# Patient Record
Sex: FEMALE | Race: WHITE | NOT HISPANIC OR LATINO | Employment: FULL TIME | URBAN - METROPOLITAN AREA
[De-identification: names, ages, dates, MRNs, and addresses within clinical notes are randomized per-mention and may not be internally consistent; named-entity substitution may affect disease eponyms.]

---

## 2017-05-16 ENCOUNTER — HOSPITAL ENCOUNTER (OUTPATIENT)
Dept: MAMMOGRAPHY | Facility: CLINIC | Age: 62
Discharge: HOME | End: 2017-05-16
Attending: FAMILY MEDICINE | Admitting: FAMILY MEDICINE
Payer: COMMERCIAL

## 2017-05-16 DIAGNOSIS — Z12.31 VISIT FOR SCREENING MAMMOGRAM: ICD-10-CM

## 2017-05-16 PROCEDURE — G0202 SCR MAMMO BI INCL CAD: HCPCS

## 2017-06-17 ENCOUNTER — HEALTH MAINTENANCE LETTER (OUTPATIENT)
Age: 62
End: 2017-06-17

## 2017-12-19 ENCOUNTER — HOSPITAL ENCOUNTER (OUTPATIENT)
Facility: CLINIC | Age: 62
Discharge: HOME | End: 2017-12-19
Attending: COLON & RECTAL SURGERY | Admitting: COLON & RECTAL SURGERY
Payer: COMMERCIAL

## 2017-12-19 ENCOUNTER — SURGERY (OUTPATIENT)
Age: 62
End: 2017-12-19

## 2017-12-19 VITALS
HEIGHT: 66 IN | RESPIRATION RATE: 17 BRPM | DIASTOLIC BLOOD PRESSURE: 78 MMHG | BODY MASS INDEX: 23.63 KG/M2 | SYSTOLIC BLOOD PRESSURE: 122 MMHG | WEIGHT: 147 LBS | OXYGEN SATURATION: 99 %

## 2017-12-19 LAB — COLONOSCOPY: NORMAL

## 2017-12-19 PROCEDURE — 25000128 H RX IP 250 OP 636: Performed by: COLON & RECTAL SURGERY

## 2017-12-19 PROCEDURE — 45378 DIAGNOSTIC COLONOSCOPY: CPT | Performed by: COLON & RECTAL SURGERY

## 2017-12-19 PROCEDURE — G0500 MOD SEDAT ENDO SERVICE >5YRS: HCPCS | Performed by: COLON & RECTAL SURGERY

## 2017-12-19 PROCEDURE — G0121 COLON CA SCRN NOT HI RSK IND: HCPCS | Performed by: COLON & RECTAL SURGERY

## 2017-12-19 RX ORDER — ONDANSETRON 2 MG/ML
4 INJECTION INTRAMUSCULAR; INTRAVENOUS EVERY 6 HOURS PRN
Status: CANCELLED | OUTPATIENT
Start: 2017-12-19

## 2017-12-19 RX ORDER — LIDOCAINE 40 MG/G
CREAM TOPICAL
Status: DISCONTINUED | OUTPATIENT
Start: 2017-12-19 | End: 2017-12-19 | Stop reason: HOSPADM

## 2017-12-19 RX ORDER — FENTANYL CITRATE 50 UG/ML
INJECTION, SOLUTION INTRAMUSCULAR; INTRAVENOUS PRN
Status: DISCONTINUED | OUTPATIENT
Start: 2017-12-19 | End: 2017-12-19 | Stop reason: HOSPADM

## 2017-12-19 RX ORDER — FLUMAZENIL 0.1 MG/ML
0.2 INJECTION, SOLUTION INTRAVENOUS
Status: CANCELLED | OUTPATIENT
Start: 2017-12-19 | End: 2017-12-19

## 2017-12-19 RX ORDER — NALOXONE HYDROCHLORIDE 0.4 MG/ML
.1-.4 INJECTION, SOLUTION INTRAMUSCULAR; INTRAVENOUS; SUBCUTANEOUS
Status: CANCELLED | OUTPATIENT
Start: 2017-12-19 | End: 2017-12-20

## 2017-12-19 RX ORDER — ONDANSETRON 4 MG/1
4 TABLET, ORALLY DISINTEGRATING ORAL EVERY 6 HOURS PRN
Status: CANCELLED | OUTPATIENT
Start: 2017-12-19

## 2017-12-19 RX ORDER — PROCHLORPERAZINE MALEATE 10 MG
10 TABLET ORAL EVERY 6 HOURS PRN
Status: CANCELLED | OUTPATIENT
Start: 2017-12-19

## 2017-12-19 RX ORDER — ONDANSETRON 2 MG/ML
4 INJECTION INTRAMUSCULAR; INTRAVENOUS
Status: DISCONTINUED | OUTPATIENT
Start: 2017-12-19 | End: 2017-12-19 | Stop reason: HOSPADM

## 2017-12-19 RX ADMIN — MIDAZOLAM 2 MG: 1 INJECTION INTRAMUSCULAR; INTRAVENOUS at 08:20

## 2017-12-19 RX ADMIN — FENTANYL CITRATE 100 MCG: 50 INJECTION, SOLUTION INTRAMUSCULAR; INTRAVENOUS at 08:20

## 2017-12-19 NOTE — H&P
Pre-Endoscopy History and Physical   Gisele Adan MRN# [id number]   YOB: 1955 Age: 62 year old   Date of Procedure: 12/19/2017   Primary care provider: Corie Churchill   Type of Endoscopy: colonoscopy   Reason for Procedure: screening   Type of Anesthesia Anticipated: Moderate Sedation   HPI:   Gisele is a 62 year old female for screening colonoscopy.  She last had a colonoscopy in 2007 that was normal.  She denies BRBPR, abdominal pain, nausea/vomiting, changes in bowel habits or unintentional weight loss.  She denies a FH of CRC.    Allergies   Allergen Reactions     Sulfa Drugs Rash      Prior to Admission Medications   Prescriptions Last Dose Informant Patient Reported? Taking?   Misc Natural Products (GLUCOSAMINE CHONDROITIN COMPLX) TABS 12/12/2017  Yes No   Sig: Take  by mouth.   Multiple Vitamins-Minerals (CENTRUM SILVER) per tablet 12/12/2017  Yes No   Sig: Take 1 tablet by mouth daily.   Omega-3 Fatty Acids ([facility] FISH OIL) 1000 MG CAPS 12/12/2017  Yes No   Sig: Take  by mouth.   calcium carbonate (OS-LINETTE 500 MG [place]. [place]) 500 MG tablet 12/12/2017  Yes No   Sig: Take  by mouth 2 times daily.   cetirizine-psuedoePHEDrine (ZYRTEC-D ALLERGY & CONGESTION) 5-120 MG per tablet More than a month  Yes No   Sig: Take 1 tablet by mouth 2 times daily as needed.   simvastatin (ZOCOR) 20 MG tablet More than a month  No No   Sig: Take one tablet at bedtime every other day.      Facility-Administered Medications: None      Patient Active Problem List   Diagnosis     Hyperlipidemia LDL goal <160     Advanced directives, counseling/discussion     Hip pain     Abdominal tenderness      Past Medical History:   Diagnosis Date     Fibroadenosis of breast     lump rt breast at 4 o'clock     Hypercholesteremia      TMJ disease       Past Surgical History:   Procedure Laterality Date     BREAST SURGERY      biopsy     COLONOSCOPY  2007    nl, repeat it in 10 years     D & C  2007    s/p ablation due to heavy  "bleeding     FLEX SIG (MEDICARE PT.)  2005    nl      NASAL SURG PROC UNLISTED  1998    submucous resection of inferior turbuinates bilat.      Social History   Substance Use Topics     Smoking status: Never Smoker     Smokeless tobacco: Never Used     Alcohol use Yes      Comment: occas- 1/week      Family History   Problem Relation Age of Onset     Arthritis Father      GASTROINTESTINAL DISEASE Father      ulcer     Musculoskeletal Disorder Father      chronic anterior horn cell dz     Hypertension Mother      Arthritis Mother      Eye Disorder Mother      cataracts     OSTEOPOROSIS Mother      DIABETES Mother      Lipids Mother      Depression Mother      Asthma Maternal Grandmother      DIABETES Paternal Grandmother      CANCER Paternal Grandmother      unknown type     Eye Disorder Sister      cataracts     Congenital Anomalies Sister 57     scleroderma july 2010     Lipids Sister      Lipids Brother      Respiratory Sister      pulmonary fibrosis      PHYSICAL EXAM:   /80  Resp 16  Ht 1.676 m (5' 6\")  Wt 66.7 kg (147 lb)  LMP 11/27/2007  SpO2 99%  BMI 23.73 kg/m2 Estimated body mass index is 23.73 kg/(m^2) as calculated from the following:    Height as of this encounter: 1.676 m (5' 6\").    Weight as of this encounter: 66.7 kg (147 lb).   RESP: lungs clear to auscultation - no rales, rhonchi or wheezes   CV: regular rates and rhythm   ASA Class 2 - Mild systemic disease    Assessment: 63 y/o woman for screening colonoscopy    Plan: Colonoscopy with moderate sedation.  Risks of the procedure were discussed including, but not limited to, bleeding, perforation and missed lesions.  Patient understands and is willing to proceed.    Martín Jo MD ....................  12/19/2017   8:18 AM  Colon and Rectal Surgery Staff  [phone]    "

## 2018-05-23 ENCOUNTER — HOSPITAL ENCOUNTER (OUTPATIENT)
Dept: MAMMOGRAPHY | Facility: CLINIC | Age: 63
Discharge: HOME | End: 2018-05-23
Attending: FAMILY MEDICINE | Admitting: FAMILY MEDICINE
Payer: COMMERCIAL

## 2018-05-23 DIAGNOSIS — Z12.31 VISIT FOR SCREENING MAMMOGRAM: ICD-10-CM

## 2018-05-23 PROCEDURE — 77067 SCR MAMMO BI INCL CAD: CPT

## 2019-05-28 ENCOUNTER — HOSPITAL ENCOUNTER (OUTPATIENT)
Dept: MAMMOGRAPHY | Facility: CLINIC | Age: 64
Discharge: HOME | End: 2019-05-28
Attending: FAMILY MEDICINE | Admitting: FAMILY MEDICINE
Payer: COMMERCIAL

## 2019-05-28 DIAGNOSIS — Z12.31 VISIT FOR SCREENING MAMMOGRAM: ICD-10-CM

## 2019-05-28 PROCEDURE — 77063 BREAST TOMOSYNTHESIS BI: CPT

## 2019-10-01 ENCOUNTER — HEALTH MAINTENANCE LETTER (OUTPATIENT)
Age: 64
End: 2019-10-01

## 2019-12-19 ENCOUNTER — HOSPITAL ENCOUNTER (EMERGENCY)
Facility: CLINIC | Age: 64
Discharge: HOME | End: 2019-12-19
Attending: EMERGENCY MEDICINE | Admitting: EMERGENCY MEDICINE
Payer: COMMERCIAL

## 2019-12-19 ENCOUNTER — APPOINTMENT (OUTPATIENT)
Dept: ULTRASOUND IMAGING | Facility: CLINIC | Age: 64
End: 2019-12-19
Attending: EMERGENCY MEDICINE
Payer: COMMERCIAL

## 2019-12-19 VITALS
TEMPERATURE: 98 F | HEART RATE: 59 BPM | SYSTOLIC BLOOD PRESSURE: 119 MMHG | RESPIRATION RATE: 16 BRPM | WEIGHT: 145 LBS | DIASTOLIC BLOOD PRESSURE: 72 MMHG | BODY MASS INDEX: 23.3 KG/M2 | OXYGEN SATURATION: 95 % | HEIGHT: 66 IN

## 2019-12-19 DIAGNOSIS — I82.491 ACUTE DEEP VEIN THROMBOSIS (DVT) OF OTHER SPECIFIED VEIN OF RIGHT LOWER EXTREMITY (H): ICD-10-CM

## 2019-12-19 LAB
ANION GAP SERPL CALCULATED.3IONS-SCNC: 1 MMOL/L (ref 3–14)
APTT PPP: 38 SEC (ref 22–37)
BASOPHILS # BLD AUTO: 0 10E9/L (ref 0–0.2)
BASOPHILS NFR BLD AUTO: 0.6 %
BUN SERPL-MCNC: 15 MG/DL (ref 7–30)
CALCIUM SERPL-MCNC: 9 MG/DL (ref 8.5–10.1)
CHLORIDE SERPL-SCNC: 108 MMOL/L (ref 94–109)
CO2 SERPL-SCNC: 33 MMOL/L (ref 20–32)
CREAT SERPL-MCNC: 0.72 MG/DL (ref 0.52–1.04)
DIFFERENTIAL METHOD BLD: NORMAL
EOSINOPHIL # BLD AUTO: 0.2 10E9/L (ref 0–0.7)
EOSINOPHIL NFR BLD AUTO: 3 %
ERYTHROCYTE [DISTWIDTH] IN BLOOD BY AUTOMATED COUNT: 12.9 % (ref 10–15)
GFR SERPL CREATININE-BSD FRML MDRD: 88 ML/MIN/{1.73_M2}
GLUCOSE SERPL-MCNC: 93 MG/DL (ref 70–99)
HCT VFR BLD AUTO: 38.8 % (ref 35–47)
HGB BLD-MCNC: 13 G/DL (ref 11.7–15.7)
IMM GRANULOCYTES # BLD: 0 10E9/L (ref 0–0.4)
IMM GRANULOCYTES NFR BLD: 0.1 %
INR PPP: 0.93 (ref 0.86–1.14)
LYMPHOCYTES # BLD AUTO: 3 10E9/L (ref 0.8–5.3)
LYMPHOCYTES NFR BLD AUTO: 40.9 %
MCH RBC QN AUTO: 31.4 PG (ref 26.5–33)
MCHC RBC AUTO-ENTMCNC: 33.5 G/DL (ref 31.5–36.5)
MCV RBC AUTO: 94 FL (ref 78–100)
MONOCYTES # BLD AUTO: 0.5 10E9/L (ref 0–1.3)
MONOCYTES NFR BLD AUTO: 7.5 %
NEUTROPHILS # BLD AUTO: 3.5 10E9/L (ref 1.6–8.3)
NEUTROPHILS NFR BLD AUTO: 47.9 %
NRBC # BLD AUTO: 0 10*3/UL
NRBC BLD AUTO-RTO: 0 /100
PLATELET # BLD AUTO: 196 10E9/L (ref 150–450)
POTASSIUM SERPL-SCNC: 3.7 MMOL/L (ref 3.4–5.3)
RBC # BLD AUTO: 4.14 10E12/L (ref 3.8–5.2)
SODIUM SERPL-SCNC: 142 MMOL/L (ref 133–144)
WBC # BLD AUTO: 7.2 10E9/L (ref 4–11)

## 2019-12-19 PROCEDURE — 99284 EMERGENCY DEPT VISIT MOD MDM: CPT | Mod: 25

## 2019-12-19 PROCEDURE — 85025 COMPLETE CBC W/AUTO DIFF WBC: CPT | Performed by: EMERGENCY MEDICINE

## 2019-12-19 PROCEDURE — 85610 PROTHROMBIN TIME: CPT | Performed by: EMERGENCY MEDICINE

## 2019-12-19 PROCEDURE — 25000132 ZZH RX MED GY IP 250 OP 250 PS 637: Performed by: EMERGENCY MEDICINE

## 2019-12-19 PROCEDURE — 93971 EXTREMITY STUDY: CPT | Mod: RT

## 2019-12-19 PROCEDURE — 80048 BASIC METABOLIC PNL TOTAL CA: CPT | Performed by: EMERGENCY MEDICINE

## 2019-12-19 PROCEDURE — 85730 THROMBOPLASTIN TIME PARTIAL: CPT | Performed by: EMERGENCY MEDICINE

## 2019-12-19 RX ADMIN — RIVAROXABAN 15 MG: 15 TABLET, FILM COATED ORAL at 23:15

## 2019-12-19 ASSESSMENT — MIFFLIN-ST. JEOR: SCORE: 1224.47

## 2019-12-19 NOTE — ED AVS SNAPSHOT
Emergency Department  [address] [place] [id number]  Phone:  [phone]  Fax:  [phone]                                    Gisele Adan   MRN: [id number]    Department:   Emergency Department   Date of Visit:  12/19/2019           After Visit Summary Signature Page    I have received my discharge instructions, and my questions have been answered. I have discussed any challenges I see with this plan with the nurse or doctor.    ..........................................................................................................................................  Patient/Patient Representative Signature      ..........................................................................................................................................  Patient Representative Print Name and Relationship to Patient    ..................................................               ................................................  Date                                   Time    ..........................................................................................................................................  Reviewed by Signature/Title    ...................................................              ..............................................  Date                                               Time          22EPIC Rev 08/18

## 2019-12-20 NOTE — ED NOTES
Pt was already on ED cart and brought to U/S via ED RN. Pt's family @ her side, remained in pt's room during exam.

## 2019-12-20 NOTE — ED PROVIDER NOTES
"History     Chief Complaint:  Leg pain     HPI  Gisele Adan is a 64 year old female who presents today with right leg pain. The patient states she had right foot surgery 2 weeks ago secondary to a torn joint lining. She states today she started to have right calf pain that feels similar to a \"rosemary horse\". She states she is otherwise healing well from surgery with only some slight numbness still. She denies chest pain or shortness of breath.     Allergies:  Doxycycline  Sulfa Drugs     Medications:    Calcium carbonate  Zyrtec  Zocor    Past Medical History:    Fibroadenosis of breast   Hypercholesteremia   TMJ disease     Past Surgical History:    Breast surgery  D&C  Nasal surgery  Ankle surgery    Family History:    Father - arthritis, ulcers, horn cell disease  Mother - hypertension, arthritis, cataracts, osteoporosis, diabetes, lipids, depression  Sister - cataracts, scleroderma, pulmonary fibrosis  Brother - lipids    Social History:  The patient was accompanied to the ED with her .  Smoking Status: Never  Smokeless Tobacco: Never  Alcohol Use: Yes   Drug Use: No   PCP: Corie Churchill   Marital Status:     Review of Systems   Positive - leg pain  Neg - numbness/tingling, swelling  Other 10pt ROS neg    Physical Exam     Patient Vitals for the past 24 hrs:   BP Temp Temp src Heart Rate Resp SpO2 Height Weight   12/19/19 2035 (!) 145/81 98  F (36.7  C) Oral 56 16 99 % 1.676 m (5' 6\") 65.8 kg (145 lb)        Physical Exam  General/Appearance: appears stated age, well-groomed, appears comfortable  Eyes: EOMI, no scleral injection, no icterus  ENT: MMM  Neck: supple, nl ROM, no stiffness  Cardiovascular: RRR, nl S1S2, no m/r/g, 2+ pulses in all 4 extremities, cap refill <2sec  Respiratory: CTAB, good air movement throughout, no wheezes/rhonchi/rales, no increased WOB, no retractions  Back: no lesions  GI: abd soft, non-distended, nttp,  no HSM, no rebound, no guarding, nl BS  MSK: JONES, good " tone, no bony abnormality, small mass palpable in R calf  Skin: warm and well-perfused, no rash, diffuse ecchymosis and mild edema to R foot, nl turgor  Neuro: GCS 15, alert and oriented, no gross focal neuro deficits  Psych: interacts appropriately  Heme: no petechia, no purpura, no active bleeding    Emergency Department Course     Imaging:  Radiology results were communicated with the patient who voiced understanding of the findings.    US Lower Extremity Venous Duplex, right:   IMPRESSION:   1. Occlusive soleal vein thrombus in the proximal to mid calf. This is  an intramuscular vein and can be treated as a deep vein. No other  evidence of DVT in the right lower extremity from the groin to the  popliteal fossa. Posterior tibial vein and peroneal veins are free of  intraluminal thrombus. No evidence of superficial thrombophlebitis.  2. Baker's cyst, as per radiology.    Laboratory:  Laboratory results were communicated with the patient who voiced understanding of the findings.    BMP: pending  CBC: pending  PTT: pending  INR: pending    Interventions:  2214 Xarelto 15 mg PO     Emergency Department Course:  Nursing notes and vitals reviewed. (8:36 PM) I performed an exam of the patient as documented above.     IV inserted, blood sent to the lab for further testing, results above.     Medicine administered as documented above.    The patient was sent for US while in the emergency department, results above.     2205 I rechecked the patient and discussed the results of their workup thus far.  Dr Castellanos will follow-up on the labs.    Findings and plan explained to the Patient. Patient discharged home with instructions regarding supportive care, medications, and reasons to return. The importance of close follow-up was reviewed. The patient was prescribed Xarelto.    Impression & Plan      Medical Decision Making:  This patient is a 64-year-old female, healthy, approximately 2 weeks out from surgery on her right foot  who presents today with discomfort in her right calf.  On exam there is palpable firmness to the calf.  DVT was found on ultrasound.  We discussed the risks and benefits of both Coumadin to Xarelto.  She is opted to go with Xarelto.  She is aware that she needs to limit her activities to not doing things that put her at risk of bleeds and trauma.  She is also aware, if she experiences any bleeding, hits herself in the head, has any other trauma that she needs to come to the ER.  Screening blood work was done here and within normal limits.    Diagnosis:    ICD-10-CM    1. Acute deep vein thrombosis (DVT) of other specified vein of right lower extremity (H) I82.491       Disposition:  Discharged to home.    Discharge Medications:  New Prescriptions    RIVAROXABAN ANTICOAGULANT (XARELTO ANTICOAGULANT) 15 MG TABS TABLET    Take 1 tablet (15 mg) by mouth 2 times daily (with meals) for 21 days    RIVAROXABAN ANTICOAGULANT (XARELTO ANTICOAGULANT) 20 MG TABS TABLET    Take 1 tablet (20 mg) by mouth daily (with dinner)      Scribe Disclosure:  I, Ezekiel Mullen, am serving as a scribe at 8:36 PM on 12/19/2019 to document services personally performed by Alyssa Gee based on my observations and the provider's statements to me.      12/19/2019    EMERGENCY DEPARTMENT      Alyssa Gee MD  12/19/19 9630

## 2019-12-20 NOTE — ED PROVIDER NOTES
I received sign out from Dr. Gee.  Please refer to their H&P for further information.  In brief, diagnosed with DVT.  Plan for xeralto prescription and outpatient management.  Signed out pending blood work.  Blood is unremarkable.  No anemia.  No CKD.  PTT is only marginally outside the normal range, non suspecific.  Normal INR.  Seems reasonable to start xeralto and discharge with close PCP follow.         Rosa Maria Castellanos MD  12/19/19 2462

## 2019-12-20 NOTE — DISCHARGE INSTRUCTIONS
Return to ED if bleeding or bruising, chest pain or shortness of breath, or other concerns.  Follow up with PCP.

## 2019-12-20 NOTE — ED TRIAGE NOTES
Left calf pain today, no known trauma. Pt. had right foot surgery 2 weeks ago. No known CP or SOB.

## 2020-06-19 ENCOUNTER — HOSPITAL ENCOUNTER (OUTPATIENT)
Dept: MAMMOGRAPHY | Facility: CLINIC | Age: 65
Discharge: HOME | End: 2020-06-19
Attending: FAMILY MEDICINE | Admitting: FAMILY MEDICINE
Payer: COMMERCIAL

## 2020-06-19 DIAGNOSIS — Z12.31 OTHER SCREENING MAMMOGRAM: ICD-10-CM

## 2020-06-19 PROCEDURE — 77067 SCR MAMMO BI INCL CAD: CPT

## 2021-01-15 ENCOUNTER — HEALTH MAINTENANCE LETTER (OUTPATIENT)
Age: 66
End: 2021-01-15

## 2021-09-04 ENCOUNTER — HEALTH MAINTENANCE LETTER (OUTPATIENT)
Age: 66
End: 2021-09-04

## 2022-02-19 ENCOUNTER — HEALTH MAINTENANCE LETTER (OUTPATIENT)
Age: 67
End: 2022-02-19

## 2022-10-16 ENCOUNTER — HEALTH MAINTENANCE LETTER (OUTPATIENT)
Age: 67
End: 2022-10-16

## 2023-04-01 ENCOUNTER — HEALTH MAINTENANCE LETTER (OUTPATIENT)
Age: 68
End: 2023-04-01

## (undated) RX ORDER — FENTANYL CITRATE 50 UG/ML
INJECTION, SOLUTION INTRAMUSCULAR; INTRAVENOUS
Status: DISPENSED
Start: 2017-12-19